# Patient Record
Sex: FEMALE | Race: WHITE | Employment: FULL TIME | URBAN - METROPOLITAN AREA
[De-identification: names, ages, dates, MRNs, and addresses within clinical notes are randomized per-mention and may not be internally consistent; named-entity substitution may affect disease eponyms.]

---

## 2017-12-19 ENCOUNTER — OFFICE VISIT (OUTPATIENT)
Dept: OBGYN CLINIC | Age: 57
End: 2017-12-19

## 2017-12-19 VITALS — SYSTOLIC BLOOD PRESSURE: 100 MMHG | DIASTOLIC BLOOD PRESSURE: 68 MMHG | WEIGHT: 189 LBS | BODY MASS INDEX: 28.74 KG/M2

## 2017-12-19 DIAGNOSIS — Z01.419 ENCOUNTER FOR ANNUAL ROUTINE GYNECOLOGICAL EXAMINATION: Primary | ICD-10-CM

## 2017-12-19 NOTE — PROGRESS NOTES
Annual exam ages 40-58    Guillermina Bardales is a No obstetric history on file. ,  56 y.o. female [facility] No LMP recorded. .    She presents for her annual checkup. She is having no significant problems. Mpm w glioblastoma  Challenged w vaginal dryness, pelvic pressure and urinary urgency; otherwise no menopausal sxs       Menstrual status:    Postmenopausal    She denies dysmenorrhea. She reports no premenstrual symptoms. Contraception:    The current method of family planning is none. Sexual history:    She  reports that she currently engages in sexual activity and has had male partners. Medical conditions:    Since her last annual GYN exam about one year ago, she has not the following changes in her health history: none. Pap and Mammogram History:    Her most recent Pap smear was normal, obtained 1 year(s) ago. The patient had a recent mammogram in Nov 2017 which was negative for malignancy. Breast Cancer History/Substance Abuse: negative    Osteoporosis History:    Family history does not include a first or second degree relative with osteopenia or osteoporosis. Past Medical History:   Diagnosis Date    Nausea & vomiting      Past Surgical History:   Procedure Laterality Date    HX BREAST BIOPSY  2008    left    HX OTHER SURGICAL  15 yrs. ago    pilonidal cyst       Current Outpatient Prescriptions   Medication Sig Dispense Refill    MULTIVITAMIN WITH MINERALS (MULTI-VIT 55 PLUS PO) Take  by mouth daily.  ascorbic acid (VITAMIN C) 500 mg tablet Take  by mouth two (2) times a day.  lysine 1,000 mg Tab Take  by mouth daily. Allergies: Review of patient's allergies indicates no known allergies. Tobacco History:  reports that she has quit smoking. She quit after 6.00 years of use. She has never used smokeless tobacco.  Alcohol Abuse:  has no alcohol history on file. Drug Abuse:  reports that she does not use illicit drugs.     Family Medical/Cancer History: Family History   Problem Relation Age of Onset    Ovarian Cancer Mother         Review of Systems - History obtained from the patient  Constitutional: negative for weight loss, fever, night sweats  HEENT: negative for hearing loss, earache, congestion, snoring, sorethroat  CV: negative for chest pain, palpitations, edema  Resp: negative for cough, shortness of breath, wheezing  GI: negative for change in bowel habits, abdominal pain, black or bloody stools  : negative for frequency, dysuria, hematuria, vaginal discharge  MSK: negative for back pain, joint pain, muscle pain  Breast: negative for breast lumps, nipple discharge, galactorrhea  Skin :negative for itching, rash, hives  Neuro: negative for dizziness, headache, confusion, weakness  Psych: negative for anxiety, depression, change in mood  Heme/lymph: negative for bleeding, bruising, pallor    Physical Exam    Visit Vitals    /68    Wt 189 lb (85.7 kg)    BMI 28.74 kg/m2       Constitutional  · Appearance: well-nourished, well developed, alert, in no acute distress    HENT  · Head and Face: appears normal    Chest  · Respiratory Effort: breathing unlabored      Breasts  · Inspection of Breasts: breasts symmetrical, no skin changes, no discharge present, nipple appearance normal, no skin retraction present  · Palpation of Breasts and Axillae: no masses present on palpation, no breast tenderness  · Axillary Lymph Nodes: no lymphadenopathy present    Gastrointestinal  · Abdominal Examination: abdomen non-tender to palpation, normal bowel sounds, no masses present  · Liver and spleen: no hepatomegaly present, spleen not palpable  · Hernias: no hernias identified    Skin  · General Inspection: no rash, no lesions identified    Neurologic/Psychiatric  · Mental Status:  · Orientation: grossly oriented to person, place and time  · Mood and Affect: mood normal, affect appropriate    Genitourinary  · External Genitalia: normal appearance for age, no discharge present, no tenderness present, no inflammatory lesions present, no masses present, no atrophy present  · Vagina: normal vaginal vault without central or paravaginal defects, no discharge present, no inflammatory lesions present, no masses present  · Bladder: non-tender to palpation  · Urethra: appears normal  · Cervix: normal   · Uterus: normal size, shape and consistency; firm 7cm mass anterior to uterus ?fibroid  · Adnexa: no adnexal tenderness present, no adnexal masses present  · Perineum: perineum within normal limits, no evidence of trauma, no rashes or skin lesions present  · Anus: anus within normal limits, no hemorrhoids present  · Inguinal Lymph Nodes: no lymphadenopathy present    Assessment:  Routine gynecologic examination  Pelvic mass    Plan:  US now- normal anatomy; no masses; suspect bowel; precautions reviewed; recent negative colonoscopy.   If sxs persist, recommend PCP evaluation  Counseled re: diet, exercise, healthy lifestyle  Return for yearly wellness visits  Rec annual mammogram

## 2017-12-21 LAB
CYTOLOGIST CVX/VAG CYTO: NORMAL
CYTOLOGY CVX/VAG DOC THIN PREP: NORMAL
DX ICD CODE: NORMAL
LABCORP, 190119: NORMAL
Lab: NORMAL
OTHER STN SPEC: NORMAL
PATH REPORT.FINAL DX SPEC: NORMAL
STAT OF ADQ CVX/VAG CYTO-IMP: NORMAL

## 2019-02-08 ENCOUNTER — OFFICE VISIT (OUTPATIENT)
Dept: OBGYN CLINIC | Age: 59
End: 2019-02-08

## 2019-02-08 VITALS
DIASTOLIC BLOOD PRESSURE: 84 MMHG | BODY MASS INDEX: 30.77 KG/M2 | WEIGHT: 203 LBS | SYSTOLIC BLOOD PRESSURE: 124 MMHG | HEIGHT: 68 IN

## 2019-02-08 DIAGNOSIS — Z01.419 WELL WOMAN EXAM WITH ROUTINE GYNECOLOGICAL EXAM: Primary | ICD-10-CM

## 2019-02-08 NOTE — PROGRESS NOTES
Annual exam ages 40-58    Audria Opitz is a No obstetric history on file. ,  66 y.o. female [facility] No LMP recorded. Patient is not currently having periods (Reason: Menopause). .    She presents for her annual checkup. She is having no significant problems. Mom passed last year; brain tumor  Busy at work  Aware of need to return to exercise/ healthy diet       Menstrual status:    Now menopausal      Sexual history:    She  reports that she currently engages in sexual activity and has had partners who are Male. Medical conditions:    Since her last annual GYN exam about one year ago, she has not the following changes in her health history: none. Pap and Mammogram History:    Her most recent Pap smear was normal, obtained 1 year(s) ago. The patient had a recent mammogram 2 months ago which was negative for malignancy. Breast Cancer History/Substance Abuse: negative    Osteoporosis History:    Family history does not include a first or second degree relative with osteopenia or osteoporosis. Past Medical History:   Diagnosis Date    Nausea & vomiting      Past Surgical History:   Procedure Laterality Date    HX BREAST BIOPSY  2008    left    HX OTHER SURGICAL  15 yrs. ago    pilonidal cyst       Current Outpatient Medications   Medication Sig Dispense Refill    LYSINE HCL PO Take  by mouth.  MULTIVITAMIN WITH MINERALS (MULTI-VIT 55 PLUS PO) Take  by mouth daily.  ascorbic acid (VITAMIN C) 500 mg tablet Take  by mouth two (2) times a day. Allergies: Patient has no known allergies. Tobacco History:  reports that she has quit smoking. She quit after 6.00 years of use. she has never used smokeless tobacco.  Alcohol Abuse:  reports that she drinks alcohol. Drug Abuse:  reports that she does not use drugs.     Family Medical/Cancer History:   Family History   Problem Relation Age of Onset    Ovarian Cancer Mother         Review of Systems - History obtained from the patient  Constitutional: negative for weight loss, fever, night sweats  HEENT: negative for hearing loss, earache, congestion, snoring, sorethroat  CV: negative for chest pain, palpitations, edema  Resp: negative for cough, shortness of breath, wheezing  GI: negative for change in bowel habits, abdominal pain, black or bloody stools  : negative for frequency, dysuria, hematuria, vaginal discharge  MSK: negative for back pain, joint pain, muscle pain  Breast: negative for breast lumps, nipple discharge, galactorrhea  Skin :negative for itching, rash, hives  Neuro: negative for dizziness, headache, confusion, weakness  Psych: negative for anxiety, depression, change in mood  Heme/lymph: negative for bleeding, bruising, pallor    Physical Exam    Visit Vitals  Ht 5' 8\" (1.727 m)   Wt 203 lb (92.1 kg)   BMI 30.87 kg/m²       Constitutional  · Appearance: well-nourished, well developed, alert, in no acute distress    HENT  · Head and Face: appears normal    Chest  · Respiratory Effort: breathing unlabored      Breasts  · Inspection of Breasts: breasts symmetrical, no skin changes, no discharge present, nipple appearance normal, no skin retraction present  · Palpation of Breasts and Axillae: no masses present on palpation, no breast tenderness  · Axillary Lymph Nodes: no lymphadenopathy present    Gastrointestinal  · Abdominal Examination: abdomen non-tender to palpation, normal bowel sounds, no masses present  · Liver and spleen: no hepatomegaly present, spleen not palpable  · Hernias: no hernias identified    Skin  · General Inspection: no rash, no lesions identified    Neurologic/Psychiatric  · Mental Status:  · Orientation: grossly oriented to person, place and time  · Mood and Affect: mood normal, affect appropriate    Genitourinary  · External Genitalia: normal appearance for age, no discharge present, no tenderness present, no inflammatory lesions present, no masses present, no atrophy present  · Vagina: normal vaginal vault without central or paravaginal defects, no discharge present, no inflammatory lesions present, no masses present  · Bladder: non-tender to palpation  · Urethra: appears normal  · Cervix: normal   · Uterus: normal size, shape and consistency  · Adnexa: no adnexal tenderness present, no adnexal masses present  · Perineum: perineum within normal limits, no evidence of trauma, no rashes or skin lesions present  · Anus: anus within normal limits, no hemorrhoids present  · Inguinal Lymph Nodes: no lymphadenopathy present    Assessment:  Routine gynecologic examination  Postmenopausal not on ERT  Her current medical status is satisfactory with no evidence of significant gynecologic issues.     Plan:  Counseled re: diet, exercise, healthy lifestyle  Return for yearly wellness visits  Rec annual mammogram

## 2019-02-08 NOTE — PATIENT INSTRUCTIONS
Well Visit, Women 48 to 72: Care Instructions  Your Care Instructions    Physical exams can help you stay healthy. Your doctor has checked your overall health and may have suggested ways to take good care of yourself. He or she also may have recommended tests. At home, you can help prevent illness with healthy eating, regular exercise, and other steps. Follow-up care is a key part of your treatment and safety. Be sure to make and go to all appointments, and call your doctor if you are having problems. It's also a good idea to know your test results and keep a list of the medicines you take. How can you care for yourself at home? · Reach and stay at a healthy weight. This will lower your risk for many problems, such as obesity, diabetes, heart disease, and high blood pressure. · Get at least 30 minutes of exercise on most days of the week. Walking is a good choice. You also may want to do other activities, such as running, swimming, cycling, or playing tennis or team sports. · Do not smoke. Smoking can make health problems worse. If you need help quitting, talk to your doctor about stop-smoking programs and medicines. These can increase your chances of quitting for good. · Protect your skin from too much sun. When you're outdoors from 10 a.m. to 4 p.m., stay in the shade or cover up with clothing and a hat with a wide brim. Wear sunglasses that block UV rays. Even when it's cloudy, put broad-spectrum sunscreen (SPF 30 or higher) on any exposed skin. · See a dentist one or two times a year for checkups and to have your teeth cleaned. · Wear a seat belt in the car. · Limit alcohol to 1 drink a day. Too much alcohol can cause health problems. Follow your doctor's advice about when to have certain tests. These tests can spot problems early. · Cholesterol.  Your doctor will tell you how often to have this done based on your age, family history, or other things that can increase your risk for heart attack and stroke. · Blood pressure. Have your blood pressure checked during a routine doctor visit. Your doctor will tell you how often to check your blood pressure based on your age, your blood pressure results, and other factors. · Mammogram. Ask your doctor how often you should have a mammogram, which is an X-ray of your breasts. A mammogram can spot breast cancer before it can be felt and when it is easiest to treat. · Pap test and pelvic exam. Ask your doctor how often you should have a Pap test. You may not need to have a Pap test as often as you used to. · Vision. Have your eyes checked every year or two or as often as your doctor suggests. Some experts recommend that you have yearly exams for glaucoma and other age-related eye problems starting at age 48. · Hearing. Tell your doctor if you notice any change in your hearing. You can have tests to find out how well you hear. · Diabetes. Ask your doctor whether you should have tests for diabetes. · Colon cancer. You should begin tests for colon cancer at age 48. You may have one of several tests. Your doctor will tell you how often to have tests based on your age and risk. Risks include whether you already had a precancerous polyp removed from your colon or whether your parents, sisters and brothers, or children have had colon cancer. · Thyroid disease. Talk to your doctor about whether to have your thyroid checked as part of a regular physical exam. Women have an increased chance of a thyroid problem. · Osteoporosis. You should begin tests for bone density at age 72. If you are younger than 72, ask your doctor whether you have factors that may increase your risk for this disease. You may want to have this test before age 72. · Heart attack and stroke risk. At least every 4 to 6 years, you should have your risk for heart attack and stroke assessed.  Your doctor uses factors such as your age, blood pressure, cholesterol, and whether you smoke or have diabetes to show what your risk for a heart attack or stroke is over the next 10 years. When should you call for help? Watch closely for changes in your health, and be sure to contact your doctor if you have any problems or symptoms that concern you. Where can you learn more? Go to http://darryl-lorena.info/. Enter V647 in the search box to learn more about \"Well Visit, Women 50 to 72: Care Instructions. \"  Current as of: March 28, 2018  Content Version: 11.9  © 0688-6795 [employer]. Care instructions adapted under license by [employer] (which disclaims liability or warranty for this information). If you have questions about a medical condition or this instruction, always ask your healthcare professional. [address] any warranty or liability for your use of this information.

## 2019-02-12 LAB
CYTOLOGIST CVX/VAG CYTO: NORMAL
CYTOLOGY CVX/VAG DOC THIN PREP: NORMAL
DX ICD CODE: NORMAL
LABCORP, 190119: NORMAL
Lab: NORMAL
Lab: NORMAL
OTHER STN SPEC: NORMAL
PATH REPORT.FINAL DX SPEC: NORMAL
STAT OF ADQ CVX/VAG CYTO-IMP: NORMAL

## 2020-02-12 ENCOUNTER — TELEPHONE (OUTPATIENT)
Dept: OBGYN CLINIC | Age: 60
End: 2020-02-12

## 2020-02-12 NOTE — TELEPHONE ENCOUNTER
Message left a 807am    64year old patient last seen in the office on 2/8/19 and was supposed to be seen this Friday but will not be able to come. Patient left a detailed message that she has emergency surgery to remove a tumor and ended up having her uterus and ovaries removed. Patient reports she is having foot surgery today . Patient will not be able to come for scheduled appointment on 2/14/2020. Patient reports she will not be able to come for at least  6-8 weeks and is wondering how to proceed. This nurse left a message for the patient to call the office back to confirm details of the message.

## 2020-02-12 NOTE — TELEPHONE ENCOUNTER
Patient of [address] [place] [address]. 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 She ended up having to have her uterus and ovary removed. She is looking to go home 2/13 or 2/14. She wants to know since Dr. Suresh Thorpe will not be doing her follow up for the removal of the uterus and ovary, when do you want to see her? She is unavailable to the phone today but wants to have someone leave her a message to advise her.

## 2020-02-12 NOTE — TELEPHONE ENCOUNTER
Dr. Dionicio Sams recommends return to office in 4-6 weeks. Please ask patient to have operative and pathology reports faxed to our office prior to her visit.

## 2020-02-13 NOTE — TELEPHONE ENCOUNTER
Per phone note from [facility] yesterday, patient was wondering about appropriate timing for post-op appointment. Dr. Chris Hawley advises 4-6 weeks. If patient can't be seen for 6 weeks, that's fine.

## 2020-02-20 ENCOUNTER — TELEPHONE (OUTPATIENT)
Dept: OBGYN CLINIC | Age: 60
End: 2020-02-20

## 2020-02-20 NOTE — TELEPHONE ENCOUNTER
Patient of [address]. Calling to set up for Post Op visit from her hysterectomy done at [facility]. Just letting you know I set her up for post op exam with you 3/11/20 at 10:30 am and her records from [facility] are in media for your review.     Thanks:)

## 2020-03-11 ENCOUNTER — OFFICE VISIT (OUTPATIENT)
Dept: OBGYN CLINIC | Age: 60
End: 2020-03-11

## 2020-03-11 VITALS — BODY MASS INDEX: 27.28 KG/M2 | HEIGHT: 68 IN | WEIGHT: 180 LBS

## 2020-03-11 DIAGNOSIS — Z09 SURGICAL FOLLOWUP VISIT: Primary | ICD-10-CM

## 2020-03-11 RX ORDER — APIXABAN 5 MG/1
TABLET, FILM COATED ORAL
COMMUNITY
Start: 2020-03-03 | End: 2021-02-16 | Stop reason: ALTCHOICE

## 2020-03-11 NOTE — PROGRESS NOTES
Meera Don is a 64 y.o. female who arrives for post-surgical follow-up from emergent ELIO BSO at [facility] for [facility] tumor [id number] causing R ovarian torsion. Also had R ankle fracture repair and IVC placed for 2 PE's. Patient had normal pelvic US 2017    Her current method of family planning is status post hysterectomy. The patient is not currently sexually active. She developed this problem approximately 6 weeks ago. Her relevant past medical history:   Past Medical History:   Diagnosis Date    Nausea & vomiting         Past Surgical History:   Procedure Laterality Date    HX BREAST BIOPSY  2008    left    HX OTHER SURGICAL  15 yrs. ago    pilonidal cyst     Social History     Occupational History    Not on file   Tobacco Use    Smoking status: Former Smoker     Years: 6.00    Smokeless tobacco: Never Used   Substance and Sexual Activity    Alcohol use: Yes     Frequency: Never     Comment: glass wine    Drug use: No    Sexual activity: Yes     Partners: Male     Family History   Problem Relation Age of Onset    Ovarian Cancer Mother        No Known Allergies  Prior to Admission medications    Medication Sig Start Date End Date Taking? Authorizing Provider   LYSINE HCL PO Take  by mouth. Provider, Historical   MULTIVITAMIN WITH MINERALS (MULTI-VIT 55 PLUS PO) Take  by mouth daily. Provider, Historical   ascorbic acid (VITAMIN C) 500 mg tablet Take  by mouth two (2) times a day.     Provider, Historical        Review of Systems - History obtained from the patient  Constitutional: negative for weight loss, fever, night sweats  HEENT: negative for hearing loss, earache, congestion, snoring, sorethroat  CV: negative for chest pain, palpitations, edema  Resp: negative for cough, shortness of breath, wheezing  Breast: negative for breast lumps, nipple discharge, galactorrhea  GI: negative for change in bowel habits, abdominal pain, black or bloody stools  : negative for frequency, dysuria, hematuria  MSK: negative for back pain, joint pain, muscle pain  Skin: negative for itching, rash, hives  Neuro: negative for dizziness, headache, confusion, weakness  Psych: negative for anxiety, depression, change in mood  Heme/lymph: negative for bleeding, bruising, pallor      Objective:  Visit Vitals  Ht 5' 8\" (1.727 m)   BMI 30.87 kg/m²          PHYSICAL EXAMINATION    Constitutional  · Appearance: well-nourished, well developed, alert, in no acute distress    HENT  · Head and Face: appears normal    Neck  · Inspection/Palpation: normal appearance, no masses or tenderness  · Lymph Nodes: no lymphadenopathy present  · Thyroid: gland size normal, nontender, no nodules or masses present on palpation  ·   Breasts  · Inspection of Breasts: breasts symmetrical, no skin changes, no discharge present, nipple appearance normal, no skin retraction present  · Palpation of Breasts and Axillae: no masses present on palpation, no breast tenderness  · Axillary Lymph Nodes: no lymphadenopathy present    Gastrointestinal  · Abdominal Examination: abdomen non-tender to palpation, normal bowel sounds, no masses present  · Liver and spleen: no hepatomegaly present, spleen not palpable  · Hernias: no hernias identified    Genitourinary  · External Genitalia: normal appearance for age, no discharge present, no tenderness present, no inflammatory lesions present, no masses present, no atrophy present  · Vagina: normal vaginal vault without central or paravaginal defects, no discharge present, no inflammatory lesions present, no masses present  · Bladder: non-tender to palpation  · Urethra: appears normal  · Cervix: absent   · Uterus: absent  · Adnexa:   no adnexal masses present  · Perineum: perineum within normal limits, no evidence of trauma, no rashes or skin lesions present  · Anus: anus within normal limits, no hemorrhoids present  · Inguinal Lymph Nodes: no lymphadenopathy present    Skin  · General Inspection: no rash, no lesions identified    Neurologic/Psychiatric  · Mental Status:  · Orientation: grossly oriented to person, place and time  · Mood and Affect: mood normal, affect appropriate    Assessment:    annual    Plan:   Reviewed menopausal management in light of hx PE  Encouraged use of \"Uber\" or any silicone based lubricant. Recent negative mammogram  Patient declines presence of chaperone during today's visit.

## 2020-11-20 ENCOUNTER — HOSPITAL ENCOUNTER (OUTPATIENT)
Dept: MAMMOGRAPHY | Age: 60
Discharge: HOME | End: 2020-11-20
Attending: OBSTETRICS & GYNECOLOGY
Payer: COMMERCIAL

## 2020-11-20 DIAGNOSIS — Z12.31 VISIT FOR SCREENING MAMMOGRAM: ICD-10-CM

## 2020-11-20 PROCEDURE — 77063 BREAST TOMOSYNTHESIS BI: CPT

## 2021-02-16 ENCOUNTER — OFFICE VISIT (OUTPATIENT)
Dept: OBGYN CLINIC | Age: 61
End: 2021-02-16
Payer: COMMERCIAL

## 2021-02-16 VITALS
DIASTOLIC BLOOD PRESSURE: 75 MMHG | HEIGHT: 68 IN | WEIGHT: 191.8 LBS | BODY MASS INDEX: 29.07 KG/M2 | SYSTOLIC BLOOD PRESSURE: 120 MMHG

## 2021-02-16 DIAGNOSIS — Z01.419 ENCOUNTER FOR GYNECOLOGICAL EXAMINATION WITHOUT ABNORMAL FINDING: Primary | ICD-10-CM

## 2021-02-16 PROCEDURE — 99396 PREV VISIT EST AGE 40-64: CPT | Performed by: OBSTETRICS & GYNECOLOGY

## 2021-02-16 NOTE — PROGRESS NOTES
Annual exam ages 40-58 post hysterectomy    Christopher Serrano is a No obstetric history on file. ,  64 y.o. female [facility] Patient's last menstrual period was 09/24/2011 (approximate). .    She presents for her annual checkup. She is having no significant problems. S/p ELIO/ BSO; s/p DVT with Bernabe filter; off meds  No menopausal sxs    Hormonal status:  She reports no perimenstrual type symptoms. She is not having vasomotor symptoms. The patient is not using ERT. Sexual history:    She  reports being sexually active and has had partner(s) who are Male. Medical conditions:    Since her last annual GYN exam about one year ago, she has not the following changes in her health history: none. Surgical history confirmed with patient. has a past surgical history that includes hx other surgical (15 yrs. ago); hx gyn (2020); hx orthopaedic (2020); ir ivc filter (2020); hx hysterectomy (2020); and hx breast biopsy (Long ago). Pap and Mammogram History:    Her most recent Pap smear was normal, obtained 2 year(s) ago. .    The patient had a recent mammogram in November which was negative for malignancy. Breast Cancer History/Substance Abuse: negative    Osteoporosis History:    Family history does not include a first or second degree relative with osteopenia or osteoporosis. Past Medical History:   Diagnosis Date   Emmit Bearded tumor     Menopause     LMP-2011?  Nausea & vomiting     Pulmonary embolism (HCC)      Past Surgical History:   Procedure Laterality Date    HX BREAST BIOPSY  Long ago    Left breast surgical biopsy, nipple area. Benign. No scar seen.  HX GYN  2020    Total abdominal hysterectomy    HX HYSTERECTOMY  2020    HX ORTHOPAEDIC  2020    Right ankle fracture repair    HX OTHER SURGICAL  15 yrs.  ago    pilonidal cyst    IR IVC FILTER  2020       Current Outpatient Medications   Medication Sig Dispense Refill    Eliquis 5 mg tablet       LYSINE HCL PO Take  by mouth.      MULTIVITAMIN WITH MINERALS (MULTI-VIT 55 PLUS PO) Take  by mouth daily.  ascorbic acid (VITAMIN C) 500 mg tablet Take  by mouth two (2) times a day. Allergies: Patient has no known allergies. Tobacco History:  reports that she has quit smoking. She quit after 6.00 years of use. She has never used smokeless tobacco.  Alcohol Abuse:  reports current alcohol use. Drug Abuse:  reports no history of drug use.     Family Medical/Cancer History:   Family History   Problem Relation Age of Onset    Ovarian Cancer Mother         Review of Systems - History obtained from the patient  Constitutional: negative for weight loss, fever, night sweats  HEENT: negative for hearing loss, earache, congestion, snoring, sorethroat  CV: negative for chest pain, palpitations, edema  Resp: negative for cough, shortness of breath, wheezing  GI: negative for change in bowel habits, abdominal pain, black or bloody stools  : negative for frequency, dysuria, hematuria, vaginal discharge  MSK: negative for back pain, joint pain, muscle pain  Breast: negative for breast lumps, nipple discharge, galactorrhea  Skin :negative for itching, rash, hives  Neuro: negative for dizziness, headache, confusion, weakness  Psych: negative for anxiety, depression, change in mood  Heme/lymph: negative for bleeding, bruising, pallor    Physical Exam    Visit Vitals  LMP 09/24/2011 (Approximate) Comment: LMP-2011?/Hysterectomy-2020     Constitutional  · Appearance: well-nourished, well developed, alert, in no acute distress    HENT  · Head and Face: appears normal      Chest  · Respiratory Effort: breathing unlabored    Breasts  · Inspection of Breasts: breasts symmetrical, no skin changes, no discharge present, nipple appearance normal, no skin retraction present  · Palpation of Breasts and Axillae: no masses present on palpation, no breast tenderness  · Axillary Lymph Nodes: no lymphadenopathy present    Gastrointestinal  · Abdominal Examination: abdomen non-tender to palpation, normal bowel sounds, no masses present  · Liver and spleen: no hepatomegaly present, spleen not palpable  · Hernias: no hernias identified    Genitourinary  · External Genitalia: normal appearance for age, no discharge present, no tenderness present, no inflammatory lesions present, no masses present, atrophy present  · Vagina: normal vaginal vault without central or paravaginal defects, no discharge present, no inflammatory lesions present, no masses present  · Bladder: non-tender to palpation  · Urethra: appears normal  · Cervix: absent  · Uterus: absent  · Adnexa: no adnexal tenderness present, no adnexal masses present  · Perineum: perineum within normal limits, no evidence of trauma, no rashes or skin lesions present  · Anus: anus within normal limits, no hemorrhoids present  · Inguinal Lymph Nodes: no lymphadenopathy present    Skin  · General Inspection: no rash, no lesions identified    Neurologic/Psychiatric  · Mental Status:  · Orientation: grossly oriented to person, place and time  · Mood and Affect: mood normal, affect appropriate    Assessment:  Routine gynecologic examination; postmenopausal  Her current medical status is satisfactory with no evidence of significant gynecologic issues. Plan:  Pap not done today  Patient offered and completed [employer] genetic screening questionnaire and no changes in personal and family pertinent medical history. Patient declines presence of chaperone during today's visit.    Counseled re: diet, exercise, healthy lifestyle  Return for yearly wellness visits  Rec annual mammogram

## 2021-02-16 NOTE — PATIENT INSTRUCTIONS
Pelvic Exam: Care Instructions Your Care Instructions When your doctor examines all of your pelvic organs, it's called a pelvic exam. Two good reasons to have this kind of exam are to check for sexually transmitted infections (STIs) and to get a Pap test. A Pap test is also called a Pap smear. It checks for early changes that can lead to cancer of the cervix. Sometimes a pelvic exam is part of a regular checkup. Your doctor may ask you to avoid vaginal sex, tampons, vaginal medicines, vaginal sprays or powders, and douching for 1 to 2 days before the test. 
Other times, women have this kind of exam at any time of the month. This is because they have pelvic pain, bleeding, or discharge. Or they may have another pelvic problem. Before your exam, it's important to share some information with your doctor. For example, if you are a survivor of rape or sexual abuse, you can talk about any concerns you may have. Your doctor will also want to know if you are pregnant or use birth control. And he or she will want to hear about any problems, surgeries, or procedures you have had in your pelvic area. You will also need to tell your doctor when your last period was. Follow-up care is a key part of your treatment and safety. Be sure to make and go to all appointments, and call your doctor if you are having problems. It's also a good idea to know your test results and keep a list of the medicines you take. How is a pelvic exam done? · During a pelvic exam, you will: ? Take off your clothes below the waist. You will get a paper or cloth cover to put over the lower half of your body. If this is regular checkup, you may undress completely and put on a gown. ? Lie on your back on an exam table. Your feet will be raised above you. Stirrups will support your feet. · The doctor will: ? Ask you to relax your knees. Your knees need to lean out, toward the walls. ? Check the opening of your vagina for sores or swelling. ? Gently put a tool called a speculum into your vagina. It opens the vagina a little bit. You will feel some pressure. But if you are relaxed, it will not hurt. It lets your doctor see inside the vagina. ? Use a small brush, spatula, or swab to get a sample of cells, if you are having a Pap test or culture. The doctor then removes the speculum. ? Put on gloves and put one or two fingers of one hand into your vagina. The other hand goes on your lower belly. This lets your doctor feel your pelvic organs. You will probably feel some pressure. Try to stay relaxed. ? Put one gloved finger into your rectum and one into your vagina, if needed. This can also help check your pelvic organs. This exam takes about 10 minutes. At the end, you will get a washcloth or tissue to clean your vaginal area. You can then get dressed. Why is a pelvic exam done? A pelvic exam may be done: · As part of a woman's regular physical checkup. The exam may include a Pap test. 
· To check for vaginal infection. · To check for sexually transmitted infections, such as chlamydia or herpes. · To help find the cause of abnormal uterine bleeding. · To look for problems like uterine fibroids, ovarian cysts, or uterine prolapse. · To find the cause of pelvic or belly pain. · Before inserting an intrauterine device (IUD) for birth control. · To collect evidence if you've been sexually assaulted. What are the risks of a pelvic exam? 
There is a small chance that the doctor will find something on a pelvic exam that would not have caused a problem. This is called overdiagnosis. It could lead to tests or treatment you don't need. When should you call for help? Watch closely for changes in your health, and be sure to contact your doctor if you have any problems. Where can you learn more? Go to http://[email or web address]/ Enter D624 in the search box to learn more about \"Pelvic Exam: Care Instructions. \" Current as of: November 8, 2019               Content Version: 12.6 © 0058-2740 [employer]. Care instructions adapted under license by [employer] (which disclaims liability or warranty for this information). If you have questions about a medical condition or this instruction, always ask your healthcare professional. [place] [address] any warranty or liability for your use of this information.

## 2021-06-29 ENCOUNTER — TRANSCRIBE ORDER (OUTPATIENT)
Dept: SCHEDULING | Age: 61
End: 2021-06-29

## 2021-06-29 DIAGNOSIS — Z12.31 VISIT FOR SCREENING MAMMOGRAM: Primary | ICD-10-CM

## 2021-11-22 ENCOUNTER — HOSPITAL ENCOUNTER (OUTPATIENT)
Dept: MAMMOGRAPHY | Age: 61
Discharge: HOME | End: 2021-11-22
Attending: FAMILY MEDICINE
Payer: COMMERCIAL

## 2021-11-22 DIAGNOSIS — Z12.31 VISIT FOR SCREENING MAMMOGRAM: ICD-10-CM

## 2021-11-22 PROCEDURE — 77063 BREAST TOMOSYNTHESIS BI: CPT

## 2022-02-17 NOTE — PATIENT INSTRUCTIONS

## 2022-02-18 ENCOUNTER — OFFICE VISIT (OUTPATIENT)
Dept: OBGYN CLINIC | Age: 62
End: 2022-02-18
Payer: COMMERCIAL

## 2022-02-18 VITALS — DIASTOLIC BLOOD PRESSURE: 78 MMHG | WEIGHT: 195 LBS | BODY MASS INDEX: 29.65 KG/M2 | SYSTOLIC BLOOD PRESSURE: 122 MMHG

## 2022-02-18 DIAGNOSIS — Z01.419 WELL WOMAN EXAM WITH ROUTINE GYNECOLOGICAL EXAM: Primary | ICD-10-CM

## 2022-02-18 PROCEDURE — 99396 PREV VISIT EST AGE 40-64: CPT | Performed by: OBSTETRICS & GYNECOLOGY

## 2022-02-18 NOTE — PROGRESS NOTES
Annual exam ages 40-58 post hysterectomy    Castro Plunkett is a No obstetric history on file. ,  63 y.o. female WHITE/NON- Patient's last menstrual period was 09/24/2011 (approximate). .    She presents for her annual checkup. She is having some concerns with weight. .  Working on weight loss thru exercise/ diet. Recent normal TSH    Hormonal status:  She reports no perimenstrual type symptoms. She is not having vasomotor symptoms. The patient is not using any ERT. Sexual history:    She  reports being sexually active and has had partner(s) who are male. Medical conditions:    Since her last annual GYN exam about one year ago, she has not the following changes in her health history: none. Surgical history confirmed with patient. has a past surgical history that includes hx other surgical (15 yrs. ago); hx gyn (2020); hx orthopaedic (2020); ir ivc filter (2020); hx hysterectomy (2020); hx breast biopsy (Long ago); and ir remove ivc filter w si. Pap and Mammogram History:    Her most recent Pap smear was normal, obtained 3 year(s) ago. .    The patient had a recent mammogram in November which was negative for malignancy. Breast Cancer History/Substance Abuse: negative    Osteoporosis History:    Family history does not include a first or second degree relative with osteopenia or osteoporosis. Past Medical History:   Diagnosis Date   Duane Paci tumor     Menopause     LMP-2011?  Nausea & vomiting     Pulmonary embolism (HCC)      Past Surgical History:   Procedure Laterality Date    HX BREAST BIOPSY  Long ago    Left breast surgical biopsy, nipple area. Benign. No scar seen.  HX GYN  2020    Total abdominal hysterectomy    HX HYSTERECTOMY  2020    HX ORTHOPAEDIC  2020    Right ankle fracture repair    HX OTHER SURGICAL  15 yrs.  ago    pilonidal cyst    IR IVC FILTER  2020    IR REMOVE IVC FILTER W SI         Current Outpatient Medications   Medication Sig Dispense Refill    LYSINE HCL PO Take  by mouth.  MULTIVITAMIN WITH MINERALS (MULTI-VIT 55 PLUS PO) Take  by mouth daily.  ascorbic acid (VITAMIN C) 500 mg tablet Take  by mouth two (2) times a day. Allergies: Patient has no known allergies. Tobacco History:  reports that she has quit smoking. She quit after 6.00 years of use. She has never used smokeless tobacco.  Alcohol Abuse:  reports previous alcohol use. Drug Abuse:  reports no history of drug use.     Family Medical/Cancer History:   Family History   Problem Relation Age of Onset    Ovarian Cancer Mother         Review of Systems - History obtained from the patient  Constitutional: negative for weight loss, fever, night sweats  HEENT: negative for hearing loss, earache, congestion, snoring, sorethroat  CV: negative for chest pain, palpitations, edema  Resp: negative for cough, shortness of breath, wheezing  GI: negative for change in bowel habits, abdominal pain, black or bloody stools  : negative for frequency, dysuria, hematuria, vaginal discharge  MSK: negative for back pain, joint pain, muscle pain  Breast: negative for breast lumps, nipple discharge, galactorrhea  Skin :negative for itching, rash, hives  Neuro: negative for dizziness, headache, confusion, weakness  Psych: negative for anxiety, depression, change in mood  Heme/lymph: negative for bleeding, bruising, pallor    Physical Exam    Visit Vitals  Wt 195 lb (88.5 kg)   LMP 09/24/2011 (Approximate) Comment: LMP-2011?/Hysterectomy-2020   BMI 29.65 kg/m²     Constitutional  · Appearance: well-nourished, well developed, alert, in no acute distress    HENT  · Head and Face: appears normal      Chest  · Respiratory Effort: breathing unlabored    Breasts  · Inspection of Breasts: breasts symmetrical, no skin changes, no discharge present, nipple appearance normal, no skin retraction present  · Palpation of Breasts and Axillae: no masses present on palpation, no breast tenderness  · Axillary Lymph Nodes: no lymphadenopathy present    Gastrointestinal  · Abdominal Examination: abdomen non-tender to palpation, normal bowel sounds, no masses present  · Liver and spleen: no hepatomegaly present, spleen not palpable  · Hernias: no hernias identified    Genitourinary  · External Genitalia: normal appearance for age, no discharge present, no tenderness present, no inflammatory lesions present, no masses present, no atrophy present  · Vagina: normal vaginal vault without central or paravaginal defects, no discharge present, no inflammatory lesions present, no masses present  · Bladder: non-tender to palpation  · Urethra: appears normal  · Cervix: absent  · Uterus: absent  · Adnexa: no adnexal tenderness present, no adnexal masses present  · Perineum: perineum within normal limits, no evidence of trauma, no rashes or skin lesions present  · Anus: anus within normal limits, no hemorrhoids present  · Inguinal Lymph Nodes: no lymphadenopathy present    Skin  · General Inspection: no rash, no lesions identified    Neurologic/Psychiatric  · Mental Status:  · Orientation: grossly oriented to person, place and time  · Mood and Affect: mood normal, affect appropriate    Assessment:  Routine gynecologic examination  Her current medical status is satisfactory with no evidence of significant gynecologic issues. Plan:  Pap not today     Patient declines presence of chaperone during today's visit.    Counseled re: diet, exercise, healthy lifestyle  Return for yearly wellness visits  Rec annual mammogram

## 2022-11-28 ENCOUNTER — HOSPITAL ENCOUNTER (OUTPATIENT)
Dept: MAMMOGRAPHY | Age: 62
Discharge: HOME | End: 2022-11-28
Attending: OBSTETRICS & GYNECOLOGY
Payer: COMMERCIAL

## 2022-11-28 ENCOUNTER — TRANSCRIBE ORDER (OUTPATIENT)
Dept: MAMMOGRAPHY | Age: 62
End: 2022-11-28

## 2022-11-28 DIAGNOSIS — Z12.31 VISIT FOR SCREENING MAMMOGRAM: ICD-10-CM

## 2022-11-28 DIAGNOSIS — Z12.31 VISIT FOR SCREENING MAMMOGRAM: Primary | ICD-10-CM

## 2022-11-28 PROCEDURE — 77063 BREAST TOMOSYNTHESIS BI: CPT

## 2023-02-21 ENCOUNTER — OFFICE VISIT (OUTPATIENT)
Dept: OBGYN CLINIC | Age: 63
End: 2023-02-21
Payer: COMMERCIAL

## 2023-02-21 VITALS — BODY MASS INDEX: 27.22 KG/M2 | SYSTOLIC BLOOD PRESSURE: 119 MMHG | WEIGHT: 179 LBS | DIASTOLIC BLOOD PRESSURE: 69 MMHG

## 2023-02-21 DIAGNOSIS — Z01.419 WELL WOMAN EXAM WITH ROUTINE GYNECOLOGICAL EXAM: Primary | ICD-10-CM

## 2023-02-21 PROCEDURE — 99396 PREV VISIT EST AGE 40-64: CPT | Performed by: OBSTETRICS & GYNECOLOGY

## 2023-02-21 NOTE — PROGRESS NOTES
Annual exam ages 40-58 post hysterectomy    Miriam Arceo is a No obstetric history on file. ,  63 y.o. female [address] [facility] Patient's last menstrual period was 09/24/2011 (approximate). .    She presents for her annual checkup. She is having no significant problems. 15lb weight loss thru healthy diet/exercise    Hormonal status:  She reports no perimenstrual type symptoms. She is not having vasomotor symptoms. The patient is not currently using any ERT. Sexual history:    She  reports being sexually active and has had partner(s) who are male. No longer sexually active    Medical conditions:    Since her last annual GYN exam about three or more years ago, she has not the following changes in her health history: none. Surgical history confirmed with patient. has a past surgical history that includes hx other surgical (15 yrs. ago); hx gyn (2020); hx orthopaedic (2020); ir ivc filter (2020); hx hysterectomy (2020); hx breast biopsy (Long ago); and ir remove ivc filter w si. Pap and Mammogram History:    Her most recent Pap smear was normal, obtained 3 year(s) ago. .    The patient had a recent mammogram 11/2022 which was negative for malignancy. Breast Cancer History/Substance Abuse: negative    Osteoporosis History:    Family history does not include a first or second degree relative with osteopenia or osteoporosis. Past Medical History:   Diagnosis Date    Darlys Mola tumor     Menopause     LMP-2011? Nausea & vomiting     Pulmonary embolism (HCC)      Past Surgical History:   Procedure Laterality Date    HX BREAST BIOPSY  Long ago    Left breast surgical biopsy, nipple area. Benign. No scar seen. HX GYN  2020    Total abdominal hysterectomy    HX HYSTERECTOMY  2020    HX ORTHOPAEDIC  2020    Right ankle fracture repair    HX OTHER SURGICAL  15 yrs.  ago    pilonidal cyst    IR IVC FILTER  2020    IR REMOVE IVC FILTER W SI         Current Outpatient Medications   Medication Sig Dispense Refill    LYSINE HCL PO Take  by mouth. MULTIVITAMIN WITH MINERALS (MULTI-VIT 55 PLUS PO) Take  by mouth daily. ascorbic acid (VITAMIN C) 500 mg tablet Take  by mouth two (2) times a day. Allergies: Patient has no known allergies. Tobacco History:  reports that she has quit smoking. Her smoking use included cigarettes. She has never used smokeless tobacco.  Alcohol Abuse:  reports that she does not currently use alcohol. Drug Abuse:  reports no history of drug use.     Family Medical/Cancer History:   Family History   Problem Relation Age of Onset    Ovarian Cancer Mother         Review of Systems - History obtained from the patient  Constitutional: negative for weight loss, fever, night sweats  HEENT: negative for hearing loss, earache, congestion, snoring, sorethroat  CV: negative for chest pain, palpitations, edema  Resp: negative for cough, shortness of breath, wheezing  GI: negative for change in bowel habits, abdominal pain, black or bloody stools  : negative for frequency, dysuria, hematuria, vaginal discharge  MSK: negative for back pain, joint pain, muscle pain  Breast: negative for breast lumps, nipple discharge, galactorrhea  Skin :negative for itching, rash, hives  Neuro: negative for dizziness, headache, confusion, weakness  Psych: negative for anxiety, depression, change in mood  Heme/lymph: negative for bleeding, bruising, pallor    Physical Exam    Visit Vitals  /69   Wt 179 lb (81.2 kg)   LMP 09/24/2011 (Approximate) Comment: LMP-2011?/Hysterectomy-2020   BMI 27.22 kg/m²     Constitutional  Appearance: well-nourished, well developed, alert, in no acute distress    HENT  Head and Face: appears normal      Chest  Respiratory Effort: breathing unlabored    Breasts  Inspection of Breasts: breasts symmetrical, no skin changes, no discharge present, nipple appearance normal, no skin retraction present  Palpation of Breasts and Axillae: no masses present on palpation, no breast tenderness  Axillary Lymph Nodes: no lymphadenopathy present    Gastrointestinal  Abdominal Examination: abdomen non-tender to palpation, normal bowel sounds, no masses present  Liver and spleen: no hepatomegaly present, spleen not palpable  Hernias: no hernias identified    Genitourinary  External Genitalia: normal appearance for age, no discharge present, no tenderness present, no inflammatory lesions present, no masses present, no atrophy present  Vagina: normal vaginal vault without central or paravaginal defects, no discharge present, no inflammatory lesions present, no masses present  Bladder: non-tender to palpation  Urethra: appears normal  Cervix: absent  Uterus: absent  Adnexa: no adnexal tenderness present, no adnexal masses present  Perineum: perineum within normal limits, no evidence of trauma, no rashes or skin lesions present  Anus: anus within normal limits, no hemorrhoids present  Inguinal Lymph Nodes: no lymphadenopathy present    Skin  General Inspection: no rash, no lesions identified    Neurologic/Psychiatric  Mental Status:  Orientation: grossly oriented to person, place and time  Mood and Affect: mood normal, affect appropriate    Assessment:  Routine gynecologic examination  Hx DVT  Her current medical status is satisfactory with no evidence of significant gynecologic issues. Plan:  Pap not done today  Patient offered and completed [employer] genetic screening questionnaire and no changes in personal and family pertinent medical history. Patient declines presence of chaperone during today's visit.    Counseled re: diet, exercise, healthy lifestyle  Return for yearly wellness visits  Rec annual mammogram

## 2023-04-23 DIAGNOSIS — Z12.31 BREAST CANCER SCREENING BY MAMMOGRAM: Primary | ICD-10-CM

## 2023-11-30 ENCOUNTER — HOSPITAL ENCOUNTER (OUTPATIENT)
Facility: HOSPITAL | Age: 63
Discharge: HOME | End: 2023-11-30
Payer: COMMERCIAL

## 2023-11-30 ENCOUNTER — TRANSCRIBE ORDERS (OUTPATIENT)
Facility: HOSPITAL | Age: 63
End: 2023-11-30

## 2023-11-30 VITALS — WEIGHT: 177 LBS | HEIGHT: 67 IN | BODY MASS INDEX: 27.78 KG/M2

## 2023-11-30 DIAGNOSIS — Z12.31 BREAST CANCER SCREENING BY MAMMOGRAM: ICD-10-CM

## 2023-11-30 DIAGNOSIS — R92.8 ABNORMAL MAMMOGRAM OF RIGHT BREAST: Primary | ICD-10-CM

## 2023-11-30 PROCEDURE — 77063 BREAST TOMOSYNTHESIS BI: CPT

## 2023-12-01 ENCOUNTER — TRANSCRIBE ORDERS (OUTPATIENT)
Facility: HOSPITAL | Age: 63
End: 2023-12-01

## 2023-12-01 DIAGNOSIS — R92.8 ABNORMAL MAMMOGRAM: Primary | ICD-10-CM

## 2023-12-29 ENCOUNTER — TRANSCRIBE ORDERS (OUTPATIENT)
Facility: HOSPITAL | Age: 63
End: 2023-12-29

## 2023-12-29 ENCOUNTER — HOSPITAL ENCOUNTER (OUTPATIENT)
Facility: HOSPITAL | Age: 63
End: 2023-12-29
Attending: OBSTETRICS & GYNECOLOGY
Payer: COMMERCIAL

## 2023-12-29 ENCOUNTER — HOSPITAL ENCOUNTER (OUTPATIENT)
Facility: HOSPITAL | Age: 63
Discharge: HOME | End: 2023-12-29
Attending: OBSTETRICS & GYNECOLOGY
Payer: COMMERCIAL

## 2023-12-29 VITALS — HEIGHT: 67 IN | BODY MASS INDEX: 27.47 KG/M2 | WEIGHT: 175 LBS

## 2023-12-29 DIAGNOSIS — R92.8 ABNORMAL MAMMOGRAM OF RIGHT BREAST: Primary | ICD-10-CM

## 2023-12-29 DIAGNOSIS — R92.8 ABNORMAL MAMMOGRAM OF RIGHT BREAST: ICD-10-CM

## 2023-12-29 DIAGNOSIS — R92.8 ABNORMAL MAMMOGRAM: ICD-10-CM

## 2023-12-29 PROCEDURE — 76642 ULTRASOUND BREAST LIMITED: CPT

## 2023-12-29 PROCEDURE — G0279 TOMOSYNTHESIS, MAMMO: HCPCS

## 2024-02-23 ENCOUNTER — OFFICE VISIT (OUTPATIENT)
Age: 64
End: 2024-02-23
Payer: COMMERCIAL

## 2024-02-23 VITALS — SYSTOLIC BLOOD PRESSURE: 124 MMHG | DIASTOLIC BLOOD PRESSURE: 70 MMHG | WEIGHT: 169 LBS | BODY MASS INDEX: 26.47 KG/M2

## 2024-02-23 DIAGNOSIS — Z01.419 WELL WOMAN EXAM: Primary | ICD-10-CM

## 2024-02-23 PROCEDURE — 99396 PREV VISIT EST AGE 40-64: CPT | Performed by: OBSTETRICS & GYNECOLOGY

## 2024-02-23 NOTE — PROGRESS NOTES
Annual exam ages postmenopausal    Shana Morales is a ,  63 y.o. female White (non-) No LMP recorded. Patient is postmenopausal..    She presents for her annual checkup. She is having {problem:88149}.         Menstrual status:    Now menopausal  She is *** using *** ERT    Sexual history:    She  has no history on file for sexual activity.    Medical conditions:    Since her last annual GYN exam about 5 years ago, she has not the following changes in her health history: none.     Pap and Mammogram History:    Her most recent Pap smear was WNL  obtained 4 year(s) ago.    The patient had a recent mammogram 2023.  Abnormal.  Repeat in 6 months    Breast Cancer History/Substance Abuse: negative    Osteoporosis History:    Family history does not include a first or second degree relative with osteopenia or osteoporosis.      Past Medical History:   Diagnosis Date    Mirza tumor     Menopause     LMP-?    Nausea & vomiting     Pulmonary embolism (HCC)      Past Surgical History:   Procedure Laterality Date    BREAST BIOPSY  Long ago    Left breast surgical biopsy, nipple area.  Benign.  No scar seen.    GYN  2020    Total abdominal hysterectomy    HYSTERECTOMY (CERVIX STATUS UNKNOWN)      IR IVC FILTER PLACEMENT W IMAGING      IR IVC FILTER RETRIEVAL      ORTHOPEDIC SURGERY      Right ankle fracture repair    OTHER SURGICAL HISTORY  15 yrs. ago    pilonidal cyst       Current Outpatient Medications   Medication Sig Dispense Refill    LYSINE HCL PO Take by mouth      ascorbic acid (VITAMIN C) 500 MG tablet Take by mouth 2 times daily       No current facility-administered medications for this visit.     Allergies: Patient has no allergy information on record.     Tobacco History:  reports that she has quit smoking. She has never used smokeless tobacco.  Alcohol Abuse:  reports that she does not currently use alcohol.  Drug Abuse:  reports no history of drug use.    Family

## 2024-02-23 NOTE — PROGRESS NOTES
Annual exam ages 40-64 post hysterectomy    Shana Morales is a ,  63 y.o. female White (non-) No LMP recorded. Patient is postmenopausal..    She presents for her annual checkup. She is having no significant problems, but would like to discuss her most recent mammogram 2023.  Reviewed studies which show 1cm cyst RUQ quadrant w fu in .  Retired this year  Working out on regular basis; 203lb in 2019 down to 169lb now    Hormonal status:  She reports no perimenstrual type symptoms.   She is not having vasomotor symptoms.  The patient is not using  ERT.    Sexual history:    She  reports that she is not currently sexually active and has had partner(s) who are male.    Medical conditions:    Since her last annual GYN exam about 1 year ago, she has not the following changes in her health history: none.   Surgical history confirmed with patient.  has a past surgical history that includes IR GUIDED IVC FILTER RETRIEVAL; Breast biopsy (Long ago); Hysterectomy (); IR GUIDED IVC FILTER PLACEMENT (); orthopedic surgery (); other surgical history (15 yrs. ago); and gyn ().    Pap and Mammogram History:    Her most recent Pap smear was WNL 2019    The patient had a recent mammogram 2023. Abnormal.  Repeat in 6 months    Breast Cancer History/Substance Abuse: negative    Osteoporosis History:    Family history does not include a first or second degree relative with osteopenia or osteoporosis.      Past Medical History:   Diagnosis Date    Mirza tumor     Menopause     LMP-?    Nausea & vomiting     Pulmonary embolism (HCC)      Past Surgical History:   Procedure Laterality Date    BREAST BIOPSY  Long ago    Left breast surgical biopsy, nipple area.  Benign.  No scar seen.    GYN  2020    Total abdominal hysterectomy    HYSTERECTOMY (CERVIX STATUS UNKNOWN)      IR IVC FILTER PLACEMENT W IMAGING      IR IVC FILTER RETRIEVAL      ORTHOPEDIC SURGERY      Right ankle